# Patient Record
Sex: MALE | Race: WHITE | NOT HISPANIC OR LATINO | Employment: UNEMPLOYED | URBAN - NONMETROPOLITAN AREA
[De-identification: names, ages, dates, MRNs, and addresses within clinical notes are randomized per-mention and may not be internally consistent; named-entity substitution may affect disease eponyms.]

---

## 2021-01-26 ENCOUNTER — OFFICE VISIT (OUTPATIENT)
Dept: FAMILY MEDICINE CLINIC | Facility: CLINIC | Age: 51
End: 2021-01-26

## 2021-01-26 VITALS
OXYGEN SATURATION: 98 % | HEIGHT: 70 IN | BODY MASS INDEX: 26.73 KG/M2 | DIASTOLIC BLOOD PRESSURE: 80 MMHG | WEIGHT: 186.7 LBS | SYSTOLIC BLOOD PRESSURE: 122 MMHG | HEART RATE: 83 BPM

## 2021-01-26 DIAGNOSIS — T14.8XXA MUSCLE TEAR: Primary | ICD-10-CM

## 2021-01-26 PROCEDURE — 99203 OFFICE O/P NEW LOW 30 MIN: CPT | Performed by: STUDENT IN AN ORGANIZED HEALTH CARE EDUCATION/TRAINING PROGRAM

## 2021-01-26 RX ORDER — CYCLOBENZAPRINE HCL 5 MG
5 TABLET ORAL 2 TIMES DAILY PRN
Qty: 60 TABLET | Refills: 3 | Status: SHIPPED | OUTPATIENT
Start: 2021-01-26

## 2021-01-26 RX ORDER — NAPROXEN 500 MG/1
500 TABLET ORAL 2 TIMES DAILY WITH MEALS
Qty: 60 TABLET | Refills: 3 | Status: SHIPPED | OUTPATIENT
Start: 2021-01-26

## 2021-01-26 NOTE — PROGRESS NOTES
"  Family Medicine Residency  Paul Salmon MD    Subjective:     Chucho Valle II is a 50 y.o. male with no concurrent medical history who presents complaining of a muscle tear.    Mr. Valle states that approximately 1 year ago, he slipped and fell in the rain and reached out to grab hold of a pole to steady himself.  At that time, he fell and heard a rip and states \"my bicep went into my armpit.\"  He states repeatedly and emphatically that his forearm, upper arm, and shoulder became swollen and discolored.  This was accompanied by significant and intense pain and gross deformity.  However, he tells me he was unable to get in to see to a doctor due to a lack of insurance and the COVID-19 pandemic thing it difficult to see new providers.    Today, he reports that his symptoms of pain or weakness, and deformity have persisted. Indeed, his left arm and bicep are significantly different than their right-sided counterparts, which is more pronounced with flexion.    He tells me \"the pain is killing me,\" referencing pain in his shoulder, bicep, and forearm.  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  There are times when his fingers go numb and his arm refuses to respond to his mental commands.  He endorses significant weakness and continued pain in the armpit and around the shoulder.  He rates his pain at a 2-6 out of 10.  There are also times when his muscles have significant spasms tightness causing him immense pain.    The following portions of the patient's history were reviewed and updated as appropriate: allergies, current medications, past family history, past medical history, past social history, past surgical history and problem list.    Past Medical Hx:  History reviewed. No pertinent past " "medical history.    Past Surgical Hx:  History reviewed. No pertinent surgical history.    Current Meds:    Current Outpatient Medications:   •  cyclobenzaprine (FLEXERIL) 5 MG tablet, Take 1 tablet by mouth 2 (Two) Times a Day As Needed for Muscle Spasms., Disp: 60 tablet, Rfl: 3  •  naproxen (Naprosyn) 500 MG tablet, Take 1 tablet by mouth 2 (Two) Times a Day With Meals., Disp: 60 tablet, Rfl: 3    Allergies:  No Known Allergies    Family Hx:  History reviewed. No pertinent family history.     Social History:  Social History     Socioeconomic History   • Marital status:      Spouse name: Not on file   • Number of children: Not on file   • Years of education: Not on file   • Highest education level: Not on file   Tobacco Use   • Smoking status: Current Some Day Smoker   • Smokeless tobacco: Never Used   Substance and Sexual Activity   • Alcohol use: Defer   • Drug use: Never   • Sexual activity: Yes       Review of Systems  Review of Systems   Musculoskeletal: Positive for myalgias (shoulder, upper arm, and forearm pain).       Objective:     /80   Pulse 83   Ht 177.8 cm (70\")   Wt 84.7 kg (186 lb 11.2 oz)   SpO2 98%   BMI 26.79 kg/m²   Physical Exam  Constitutional:       General: He is not in acute distress.     Appearance: Normal appearance. He is normal weight. He is not ill-appearing, toxic-appearing or diaphoretic.   HENT:      Head: Normocephalic and atraumatic.   Neck:      Musculoskeletal: Normal range of motion.   Musculoskeletal:         General: Tenderness (left shoulder and upper extremity tenderness on exam and during flexion ) and deformity (left arm bicep deformity during flexation; left bicep smaller than right) present.      Comments: Impaired active range of motion with left arm and pain on passive range of motion of left arm with external rotation.   Neurological:      Mental Status: He is alert.      Motor: Weakness (4/5 strength left forearm on physical exam) present.        " "  Assessment/Plan:     Chucho Valle II is a 50 y.o. male with no concurrent medical history who presents complaining of a muscle tear.    Diagnoses and all orders for this visit:    1. Muscle tear (Primary)  Mr. Valle states that approximately 1 year ago, he slipped and fell in the rain and reached out to grab hold of a pole to steady himself.  At that time, he fell and heard a rip and states \"my bicep went into my armpit.\"  He states repeatedly and emphatically that his forearm, upper arm, and shoulder became swollen and discolored.  This was accompanied by significant and intense pain and gross deformity.  However, he tells me he was unable to get in to see to a doctor due to a lack of insurance and the COVID-19 pandemic thing it difficult to see new providers. Today, he reports that his symptoms of pain or weakness, and deformity have persisted.  Indeed, his left arm and bicep are significantly different than their right-sided counterparts, which is more pronounced with flexation. 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      Due to his concerning history symptoms and his impressive deformity, pain, and weakness elicited on physical exam, I am ordering an MRI of his upper extremity and providing him with an ambulatory referral to orthopedic surgery.  Additionally, to treat his pain and inflammation I am prescribing naproxen 500 mg and to treat his muscle spasms I am prescribing him Flexeril, a muscle relaxant.    -     naproxen (Naprosyn) 500 MG tablet; Take 1 tablet by mouth 2 (Two) Times a Day With Meals.  Dispense: 60 tablet; Refill: 3  -     cyclobenzaprine (FLEXERIL) 5 MG tablet; Take 1 tablet by mouth 2 (Two) Times a Day As Needed for Muscle Spasms.  Dispense: 60 tablet; Refill: 3  -     Ambulatory Referral to Orthopedic Surgery  -     MRI brachial plexus upper extremity left w contrast; Future      · Rx " changes: Prescribed naproxen and Flexeril for muscle pain and spasms, respectively      · Patient Education: Encouraged to follow-up with orthopedic surgery  · Compliance at present is estimated to be excellent.      Follow-up:       Return in about 2 months (around 3/26/2021) for Annual Physical.    Preventative:  Health Maintenance   Topic Date Due   • COLONOSCOPY  1970   • ANNUAL PHYSICAL  08/16/1973   • Pneumococcal Vaccine 0-64 (1 of 1 - PPSV23) 08/16/1976   • TDAP/TD VACCINES (1 - Tdap) 08/16/1989   • INFLUENZA VACCINE  08/01/2020   • ZOSTER VACCINE (1 of 2) 08/16/2020   • HEPATITIS C SCREENING  01/18/2021   • MENINGOCOCCAL VACCINE  Aged Out       Alcohol use: Alcohol use questions deferred to the physician.  Nicotine status  reports that he has been smoking. He has never used smokeless tobacco.    Goals repair of muscle tear   Access to orthopedic surgery         RISK SCORE: 3      This document has been electronically signed by Paul Salmon MD on January 26, 2021 17:13 CST

## 2021-01-29 NOTE — PROGRESS NOTES
I have helped formulate and discussed the assessment and plan with Dr.Kyle Salmon and the patient..  I have also seen and  spoken with the patient  I have spoken with the patient .   I have reviewed the notes, assessments, and/or procedures performed by Dr. Paul Salmon, I concur with his  documentation and assessment and plan for Chucho Valle II.          This document has been electronically signed by Munir Dalton MD on January 29, 2021 10:40 CST

## 2021-02-04 ENCOUNTER — HOSPITAL ENCOUNTER (OUTPATIENT)
Dept: GENERAL RADIOLOGY | Facility: HOSPITAL | Age: 51
Discharge: HOME | End: 2021-02-04

## 2021-02-04 ENCOUNTER — HOSPITAL ENCOUNTER (OUTPATIENT)
Dept: MRI IMAGING | Facility: HOSPITAL | Age: 51
Discharge: HOME | End: 2021-02-04

## 2021-02-04 DIAGNOSIS — T15.92XA FOREIGN BODY, EYE, LEFT, INITIAL ENCOUNTER: ICD-10-CM

## 2021-02-04 DIAGNOSIS — T14.8XXA MUSCLE TEAR: ICD-10-CM

## 2021-02-04 PROCEDURE — A9579 GAD-BASE MR CONTRAST NOS,1ML: HCPCS | Performed by: STUDENT IN AN ORGANIZED HEALTH CARE EDUCATION/TRAINING PROGRAM

## 2021-02-04 PROCEDURE — 25010000002 GADOTERIDOL PER 1 ML: Performed by: STUDENT IN AN ORGANIZED HEALTH CARE EDUCATION/TRAINING PROGRAM

## 2021-02-04 PROCEDURE — 70250 X-RAY EXAM OF SKULL: CPT

## 2021-02-04 PROCEDURE — 73220 MRI UPPR EXTREMITY W/O&W/DYE: CPT

## 2021-02-04 RX ADMIN — GADOTERIDOL 19 ML: 279.3 INJECTION, SOLUTION INTRAVENOUS at 15:39

## 2021-02-24 ENCOUNTER — OFFICE VISIT (OUTPATIENT)
Dept: ORTHOPEDIC SURGERY | Facility: CLINIC | Age: 51
End: 2021-02-24

## 2021-02-24 VITALS
HEIGHT: 70 IN | SYSTOLIC BLOOD PRESSURE: 158 MMHG | OXYGEN SATURATION: 97 % | WEIGHT: 183 LBS | HEART RATE: 84 BPM | BODY MASS INDEX: 26.2 KG/M2 | RESPIRATION RATE: 18 BRPM | TEMPERATURE: 98 F | DIASTOLIC BLOOD PRESSURE: 94 MMHG

## 2021-02-24 DIAGNOSIS — S46.212A RUPTURE OF LEFT BICEPS TENDON, INITIAL ENCOUNTER: ICD-10-CM

## 2021-02-24 DIAGNOSIS — T14.8XXA MUSCLE TEAR: Primary | ICD-10-CM

## 2021-02-24 PROCEDURE — 99203 OFFICE O/P NEW LOW 30 MIN: CPT | Performed by: ORTHOPAEDIC SURGERY

## 2021-02-24 NOTE — PROGRESS NOTES
Chucho Valle II is a 50 y.o. male   Primary provider:  Provider, No Known       Chief Complaint   Patient presents with   • Left Upper Arm - Pain, Initial Evaluation     Mri @ [facility]  HISTORY OF PRESENT ILLNESS:left biceps pain.      This is the first office visit for evaluation of a problem with the left arm.    Mr. Valle is 50 years old and right-hand dominant.  He said he slipped in April 2020 and fell forward.  While doing so he held onto something with his left hand with the left shoulder extended and elbow flexed.  He had prompt onset of deformity in the left arm.  He has had pain since then.  He has had no treatment for this.  He complains of pain diffusely in the arm worse with use of the hand.  He also admits to intermittent tingling sensation all fingers in the left hand.  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  He works as a .  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    Home medications include Flexeril and Naprosyn.  He has no drug allergies.  He smokes about 1 pack/day of cigarettes.  Past medical history is obtained solely from the patient.  He says his general health is good.  He was previously employed as a .  He is .    Pain  This is a new problem. The current episode started more than 1 month ago (04/2020). The problem occurs intermittently. The problem has been gradually worsening. Associated symptoms include joint swelling. Associated symptoms comments: Crushing,grinding,stabbing,aching,redness,clicking/bruising,pain and swelling . Exacerbated by: sitting,walking. The treatment provided no relief.        CONCURRENT MEDICAL HISTORY:    History reviewed. No pertinent past medical history.    No Known Allergies      Current Outpatient Medications:   •  cyclobenzaprine (FLEXERIL) 5 MG tablet, Take 1 tablet by mouth 2 (Two) Times a Day As Needed for Muscle  "Spasms., Disp: 60 tablet, Rfl: 3  •  naproxen (Naprosyn) 500 MG tablet, Take 1 tablet by mouth 2 (Two) Times a Day With Meals., Disp: 60 tablet, Rfl: 3    No past surgical history on file.    History reviewed. No pertinent family history.     Social History     Socioeconomic History   • Marital status:      Spouse name: Not on file   • Number of children: Not on file   • Years of education: Not on file   • Highest education level: Not on file   Tobacco Use   • Smoking status: Current Some Day Smoker   • Smokeless tobacco: Never Used   Substance and Sexual Activity   • Alcohol use: Defer   • Drug use: Never   • Sexual activity: Yes        Review of Systems   HENT: Positive for hearing loss and tinnitus.    Musculoskeletal: Positive for joint swelling.        Muscle pain     Psychiatric/Behavioral: Positive for sleep disturbance.     Review of systems is positive as noted above.  PHYSICAL EXAMINATION:       Vitals:    02/24/21 1426   BP: 158/94   BP Location: Right arm   Patient Position: Sitting   Cuff Size: Adult   Pulse: 84   Resp: 18   Temp: 98 °F (36.7 °C)   SpO2: 97%   Weight: 83 kg (183 lb)   Height: 177.8 cm (70\")   PainSc:   3       Physical Exam he is healthy-appearing alert and in no apparent distress at rest.  He has a somewhat peculiar affect.  He is circumstantial.    GAIT:     [x]  Normal  []  Antalgic    Assistive device: [x]  None  []  Walker     []  Crutches  []  Cane     []  Wheelchair  []  Stretcher    Ortho Exam is directed to the upper extremities.  There is moderate atrophy of the anterior compartment of the left arm with asymmetry of the arms consistent with a distal rupture of the left biceps.  Left elbow motion is full.  He is moderately apprehensive about exam of the limb.  I am unable to palpate the biceps tendon distally.  The anterior axillary fold is unremarkable.  Radial pulses strong.  Sensory exam is intact to soft touch.  Circumference of the left arm is 31 cm as measured 10 " cm above the antecubital flexion crease.  Corresponding measurement on the right is 34 cm.      Xr Skull Lateral & Ap    Result Date: 2/4/2021  Narrative: Procedure: Skull Indication:  Metal screening prior to MRI.   . Technique:  Two views   . Prior relevant exam:  None. No intraorbital metal.     Impression: No intraorbital metal. Electronically signed by:  Andry Packer MD  2/4/2021 2:44 PM CST Workstation: [id number]    Mri Brachial Plexus Upper Extremity Left W Wo Contrast    Result Date: 2/4/2021  Narrative: Traumatic injury one year ago with muscle spasms in the left arm. MRI brachial plexus upper extremity left with and without intravenous contrast. MRI scan 1.5 Barbara. ProHance 19 mL injected IV. No avulsion injury is seen. The roots, trunks, divisions, cords are unremarkable bilaterally. No abnormal intensity is seen in the left supraclavicular region. Following contrast administration no abnormal enhancement is identified.     Impression: CONCLUSION: No brachial plexus injury is seen. Electronically signed by:  Sebastian Krueger MD  2/4/2021 7:17 PM CST Workstation: [id number]          ASSESSMENT: Chronic rupture distal tendon left biceps.    We had a prolonged discussion concerning the natural history of the disorder and treatment options.  I had to repeat myself multiple times during the discussion.  He understands there is no treatment that would predictably  return him to normal function of the left upper extremity.  It is unclear why he has had so much impairment as a result of the this condition.  Normally untreated ruptures of the biceps will result in some limited functional impairment but little to no pain.  I told him I did not understand why he was having as much trouble with it as he is.  He understands that repair of an acute or subacute rupture is possible.  However results of construction of a chronic rupture would be unpredictable and I have advised against any active treatment.  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  I suggested consultation with an elbow specialist.  I suggested referral to hand surgeon in [place].  He seemed to be agreeable with this.  He was reassured he may use the limb as tolerated and he will not harm anything by vigorous use of the hand.    No routine follow-up is scheduled.        Diagnoses and all orders for this visit:    Muscle tear    Rupture of left biceps tendon, initial encounter          PLAN        Patient's Body mass index is 26.26 kg/m². BMI is above normal parameters. Recommendations include: exercise counseling, nutrition counseling and referral to primary care.    Return if symptoms worsen or fail to improve.    Fernando Ray MD

## 2021-03-22 ENCOUNTER — HOSPITAL ENCOUNTER (OUTPATIENT)
Dept: MRI IMAGING | Facility: HOSPITAL | Age: 51
Discharge: HOME | End: 2021-03-22
Admitting: SURGERY

## 2021-03-22 DIAGNOSIS — S46.292A OTHER INJURY OF MUSCLE, FASCIA AND TENDON OF OTHER PARTS OF BICEPS, LEFT ARM, INITIAL ENCOUNTER: ICD-10-CM

## 2021-03-22 PROCEDURE — 73221 MRI JOINT UPR EXTREM W/O DYE: CPT
